# Patient Record
Sex: MALE
[De-identification: names, ages, dates, MRNs, and addresses within clinical notes are randomized per-mention and may not be internally consistent; named-entity substitution may affect disease eponyms.]

---

## 2017-06-06 ENCOUNTER — APPOINTMENT (OUTPATIENT)
Dept: GASTROENTEROLOGY | Facility: AMBULATORY MEDICAL SERVICES | Age: 57
End: 2017-06-06

## 2017-12-20 ENCOUNTER — RESULT REVIEW (OUTPATIENT)
Age: 57
End: 2017-12-20

## 2017-12-29 ENCOUNTER — TRANSCRIPTION ENCOUNTER (OUTPATIENT)
Age: 57
End: 2017-12-29

## 2018-08-19 ENCOUNTER — TRANSCRIPTION ENCOUNTER (OUTPATIENT)
Age: 58
End: 2018-08-19

## 2019-05-19 ENCOUNTER — TRANSCRIPTION ENCOUNTER (OUTPATIENT)
Age: 59
End: 2019-05-19

## 2019-08-29 ENCOUNTER — APPOINTMENT (OUTPATIENT)
Dept: INTERNAL MEDICINE | Facility: CLINIC | Age: 59
End: 2019-08-29
Payer: COMMERCIAL

## 2019-08-29 VITALS
HEIGHT: 68 IN | WEIGHT: 216 LBS | DIASTOLIC BLOOD PRESSURE: 100 MMHG | BODY MASS INDEX: 32.74 KG/M2 | HEART RATE: 94 BPM | TEMPERATURE: 99.1 F | SYSTOLIC BLOOD PRESSURE: 160 MMHG | OXYGEN SATURATION: 95 %

## 2019-08-29 VITALS — SYSTOLIC BLOOD PRESSURE: 142 MMHG | DIASTOLIC BLOOD PRESSURE: 82 MMHG

## 2019-08-29 DIAGNOSIS — M54.2 CERVICALGIA: ICD-10-CM

## 2019-08-29 DIAGNOSIS — Z83.49 FAMILY HISTORY OF OTHER ENDOCRINE, NUTRITIONAL AND METABOLIC DISEASES: ICD-10-CM

## 2019-08-29 DIAGNOSIS — Z82.49 FAMILY HISTORY OF ISCHEMIC HEART DISEASE AND OTHER DISEASES OF THE CIRCULATORY SYSTEM: ICD-10-CM

## 2019-08-29 DIAGNOSIS — R53.83 OTHER FATIGUE: ICD-10-CM

## 2019-08-29 DIAGNOSIS — Z80.0 FAMILY HISTORY OF MALIGNANT NEOPLASM OF DIGESTIVE ORGANS: ICD-10-CM

## 2019-08-29 DIAGNOSIS — Z83.71 FAMILY HISTORY OF COLONIC POLYPS: ICD-10-CM

## 2019-08-29 DIAGNOSIS — Z78.9 OTHER SPECIFIED HEALTH STATUS: ICD-10-CM

## 2019-08-29 DIAGNOSIS — Z00.00 ENCOUNTER FOR GENERAL ADULT MEDICAL EXAMINATION W/OUT ABNORMAL FINDINGS: ICD-10-CM

## 2019-08-29 PROCEDURE — 99205 OFFICE O/P NEW HI 60 MIN: CPT

## 2019-09-02 PROBLEM — R53.83 FATIGUE: Status: ACTIVE | Noted: 2019-08-29

## 2019-09-02 PROBLEM — M54.2 NECK PAIN: Status: ACTIVE | Noted: 2019-08-29

## 2019-09-02 PROBLEM — Z00.00 ENCOUNTER FOR PREVENTIVE HEALTH EXAMINATION: Status: ACTIVE | Noted: 2017-05-16

## 2019-09-02 NOTE — HISTORY OF PRESENT ILLNESS
[FreeTextEntry1] : The patient is a 59 year old male presents with neck pain and to re establish care. [de-identified] : The patient is a 59 year old male with no significant past medical history presents with complaint of right sided neck pain for the past week. 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 he denies recent falls or trauma. He has been taking over the counter Alleve and Tylenol with limited relief. He had similar symptoms several years ago and improved with anti inflammatories and muscle relaxant at that time. He denies upper extremity weakness or paresthesias. He admits to fatigue. He eats a poor diet and does not exercise regularly.

## 2019-09-02 NOTE — REVIEW OF SYSTEMS
[Negative] : Heme/Lymph [Fatigue] : fatigue [Muscle Pain] : muscle pain [FreeTextEntry9] : neck  pain

## 2019-09-02 NOTE — HEALTH RISK ASSESSMENT
[Good] : ~his/her~  mood as  good [Yes] : Yes [No falls in past year] : Patient reported no falls in the past year [0] : 2) Feeling down, depressed, or hopeless: Not at all (0) [Employed] : employed [] : No [de-identified] : occasional [Change in mental status noted] : No change in mental status noted [Language] : denies difficulty with language [Behavior] : denies difficulty with behavior [Handling Complex Tasks] : denies difficulty handling complex tasks [Reasoning] : denies difficulty with reasoning [ColonoscopyDate] : 06/17

## 2019-09-02 NOTE — PHYSICAL EXAM
[No Acute Distress] : no acute distress [Well Nourished] : well nourished [Well Developed] : well developed [Well-Appearing] : well-appearing [Normal Sclera/Conjunctiva] : normal sclera/conjunctiva [PERRL] : pupils equal round and reactive to light [Normal Outer Ear/Nose] : the outer ears and nose were normal in appearance [EOMI] : extraocular movements intact [Normal Oropharynx] : the oropharynx was normal [Normal TMs] : both tympanic membranes were normal [No Lymphadenopathy] : no lymphadenopathy [No JVD] : no jugular venous distention [Supple] : supple [No Respiratory Distress] : no respiratory distress  [Thyroid Normal, No Nodules] : the thyroid was normal and there were no nodules present [No Accessory Muscle Use] : no accessory muscle use [Clear to Auscultation] : lungs were clear to auscultation bilaterally [Normal Rate] : normal rate  [Regular Rhythm] : with a regular rhythm [No Murmur] : no murmur heard [Normal S1, S2] : normal S1 and S2 [No Edema] : there was no peripheral edema [Soft] : abdomen soft [Non Tender] : non-tender [No Masses] : no abdominal mass palpated [Non-distended] : non-distended [No HSM] : no HSM [Normal Posterior Cervical Nodes] : no posterior cervical lymphadenopathy [Normal Bowel Sounds] : normal bowel sounds [Normal Anterior Cervical Nodes] : no anterior cervical lymphadenopathy [No Joint Swelling] : no joint swelling [Grossly Normal Strength/Tone] : grossly normal strength/tone [Coordination Grossly Intact] : coordination grossly intact [No Rash] : no rash [No Focal Deficits] : no focal deficits [Normal Gait] : normal gait [Normal Affect] : the affect was normal [Alert and Oriented x3] : oriented to person, place, and time [Normal Insight/Judgement] : insight and judgment were intact [de-identified] : decreased range of motion with rotation to the right, paraspinal muscle tenderness right side of neck.  [de-identified] : obese

## 2019-09-02 NOTE — COUNSELING
[Encouraged to increase physical activity] : Encouraged to increase physical activity [Benefits of weight loss discussed] : Benefits of weight loss discussed

## 2019-09-04 ENCOUNTER — TRANSCRIPTION ENCOUNTER (OUTPATIENT)
Age: 59
End: 2019-09-04

## 2019-09-05 ENCOUNTER — APPOINTMENT (OUTPATIENT)
Dept: INTERNAL MEDICINE | Facility: CLINIC | Age: 59
End: 2019-09-05
Payer: COMMERCIAL

## 2019-09-05 VITALS — BODY MASS INDEX: 31.83 KG/M2 | WEIGHT: 210 LBS | HEIGHT: 68 IN

## 2019-09-05 VITALS
SYSTOLIC BLOOD PRESSURE: 154 MMHG | OXYGEN SATURATION: 96 % | DIASTOLIC BLOOD PRESSURE: 86 MMHG | HEART RATE: 79 BPM | TEMPERATURE: 98.7 F

## 2019-09-05 DIAGNOSIS — R79.89 OTHER SPECIFIED ABNORMAL FINDINGS OF BLOOD CHEMISTRY: ICD-10-CM

## 2019-09-05 PROCEDURE — 99214 OFFICE O/P EST MOD 30 MIN: CPT

## 2019-09-05 RX ORDER — METAXALONE 800 MG/1
800 TABLET ORAL 3 TIMES DAILY
Qty: 21 | Refills: 0 | Status: COMPLETED | COMMUNITY
Start: 2019-08-29 | End: 2019-09-05

## 2019-09-05 RX ORDER — MELOXICAM 15 MG/1
15 TABLET ORAL DAILY
Qty: 14 | Refills: 0 | Status: COMPLETED | COMMUNITY
Start: 2019-08-29 | End: 2019-09-05

## 2019-09-05 NOTE — PHYSICAL EXAM
[Normal Sclera/Conjunctiva] : normal sclera/conjunctiva [EOMI] : extraocular movements intact [No Edema] : there was no peripheral edema [Normal] : soft, non-tender, non-distended, no masses palpated, no HSM and normal bowel sounds [No Rash] : no rash [No CVA Tenderness] : no CVA  tenderness [No Focal Deficits] : no focal deficits [Coordination Grossly Intact] : coordination grossly intact [Normal Gait] : normal gait [Normal Affect] : the affect was normal [Normal Insight/Judgement] : insight and judgment were intact [Alert and Oriented x3] : oriented to person, place, and time [de-identified] : obese

## 2019-09-05 NOTE — HISTORY OF PRESENT ILLNESS
[de-identified] :  The patient is a 59-year-old male with no significant past medical history he presents with complaints of nausea for the past 2 weeks. He was recently seen for right-sided neck pain and reports that these symptoms resolved within 2 days of starting anti-inflammatory and muscle relaxant medication. He is no longer taking these medications. She was seen at urgent care 2 days ago with complaints of persistent nausea. Blood work hat had been requested by me was drawn on that day.  He admits that nausea has been present on and off for "a couple weeks". He denies associated vomiting, change in bowels, bright red blood per rectum,  melena or urinary frequency, urgency, dysuria.. He denies recent travel or sick contacts. He has a history of diverticulitis in the past but denies any symptoms of abdominal pain at present. He admits to occasional headaches and recent blood pressure readings have been elevated in this office at the urgent care. He has not been exercising regularly and his weight is relatively stable. patient admits that he has been under an extreme amount of stress as his son is going to be  this weekend and his mother has not been well. he has been eating a very bland diet over the last 24-48 hours and admits that this morning he is feeling a bit better. [FreeTextEntry1] : . The patient is a 59-year-old male who presents with complaints of nausea.

## 2019-09-05 NOTE — REVIEW OF SYSTEMS
[Fatigue] : fatigue [Nausea] : nausea [Headache] : headache [Negative] : Heme/Lymph [Fever] : no fever [Chills] : no chills [Constipation] : no constipation [Abdominal Pain] : no abdominal pain [Heartburn] : no heartburn [Vomiting] : no vomiting [Melena] : no melena [Skin Rash] : no skin rash

## 2019-09-05 NOTE — DATA REVIEWED
[FreeTextEntry1] : recent blood work from September 3, 2019 was reviewed with the patient. Abnormal results were as follows:\par \par Platelets = 423 elevated\par BUN = 25 elevated\par Creatinine = 2.32 elevated\par \par Total cholesterol = 211 elevated\par HDL cholesterol = 31 low\par LDL cholesterol = 151 elevated\par \par Vitamin D = 17.2 low

## 2019-09-12 ENCOUNTER — FORM ENCOUNTER (OUTPATIENT)
Age: 59
End: 2019-09-12

## 2019-09-12 ENCOUNTER — APPOINTMENT (OUTPATIENT)
Dept: INTERNAL MEDICINE | Facility: CLINIC | Age: 59
End: 2019-09-12
Payer: COMMERCIAL

## 2019-09-12 VITALS
HEART RATE: 76 BPM | DIASTOLIC BLOOD PRESSURE: 90 MMHG | WEIGHT: 210 LBS | BODY MASS INDEX: 33.75 KG/M2 | HEIGHT: 66 IN | SYSTOLIC BLOOD PRESSURE: 150 MMHG

## 2019-09-12 DIAGNOSIS — N28.9 DISORDER OF KIDNEY AND URETER, UNSPECIFIED: ICD-10-CM

## 2019-09-12 DIAGNOSIS — K21.9 GASTRO-ESOPHAGEAL REFLUX DISEASE W/OUT ESOPHAGITIS: ICD-10-CM

## 2019-09-12 PROCEDURE — 99213 OFFICE O/P EST LOW 20 MIN: CPT

## 2019-09-12 RX ORDER — OMEPRAZOLE MAGNESIUM 20 MG/1
20 TABLET, DELAYED RELEASE ORAL DAILY
Qty: 14 | Refills: 0 | Status: ACTIVE | COMMUNITY
Start: 2019-09-12

## 2019-09-12 NOTE — HISTORY OF PRESENT ILLNESS
[FreeTextEntry8] : RISHABH PARSONS is a 59 year old male who presents with complaints of elevated blood pressure and nausea. He was recently evaluated by PCP (Dr. Ferrer) last week for similar complaints. He verbalizes continued stress related to planning/preparations for his son's wedding this Saturday and he inquires on whether this could be the cause for his uncontrolled BP. At home, he has been checking intermittently, readings are consistently high (150's/high 80s-90s). He also insists that pror to May, he had normal BP readings (has examinations done for his job). He denies any headaches, visual disturbances, chest pains or shortness of breath, but continues to feel nauseous. He has been burping, as well, and finds the nausea is worse while lying down at night. He also feels some nasal congestion at night, as well. He has not been taking any OTC antacids. His last drink was 1 month ago (stopped due to the nausea), usually drinks 1 drink at night. Admits to poor diet, eats mainly [employer] and other fast food due to the nature of his work, finds it hard to find time to cook...then later admits he is likely makign excuses and could probably cook more and choose healthier foods as well as exercise. He had some neck pain last month which he feels was likely stress-related, improved with muscle relaxer and Meloxicam and had been taking "a lot of Aleve" prior to that, as well. He stopped all pain meds/muscle relaxers once pain improved (took for ~4 days total).

## 2019-09-12 NOTE — PHYSICAL EXAM
[No Acute Distress] : no acute distress [Well Developed] : well developed [Well Nourished] : well nourished [Well-Appearing] : well-appearing [Normal Sclera/Conjunctiva] : normal sclera/conjunctiva [EOMI] : extraocular movements intact [PERRL] : pupils equal round and reactive to light [Normal Oropharynx] : the oropharynx was normal [Normal Outer Ear/Nose] : the outer ears and nose were normal in appearance [No JVD] : no jugular venous distention [No Lymphadenopathy] : no lymphadenopathy [Supple] : supple [Thyroid Normal, No Nodules] : the thyroid was normal and there were no nodules present [No Respiratory Distress] : no respiratory distress  [No Accessory Muscle Use] : no accessory muscle use [Clear to Auscultation] : lungs were clear to auscultation bilaterally [Normal Rate] : normal rate  [Regular Rhythm] : with a regular rhythm [Normal S1, S2] : normal S1 and S2 [No Murmur] : no murmur heard [No Carotid Bruits] : no carotid bruits [No Abdominal Bruit] : a ~M bruit was not heard ~T in the abdomen [No Varicosities] : no varicosities [Pedal Pulses Present] : the pedal pulses are present [No Edema] : there was no peripheral edema [No Palpable Aorta] : no palpable aorta [No Extremity Clubbing/Cyanosis] : no extremity clubbing/cyanosis [Non Tender] : non-tender [Soft] : abdomen soft [No Masses] : no abdominal mass palpated [Non-distended] : non-distended [No HSM] : no HSM [Normal Bowel Sounds] : normal bowel sounds [No CVA Tenderness] : no CVA  tenderness [Coordination Grossly Intact] : coordination grossly intact [Normal Gait] : normal gait [Deep Tendon Reflexes (DTR)] : deep tendon reflexes were 2+ and symmetric [Normal Insight/Judgement] : insight and judgment were intact [Normal Affect] : the affect was normal [de-identified] : appears slightly anxious

## 2019-09-12 NOTE — PLAN
[FreeTextEntry1] : #GERD: discussed dietary changes including lowering acidic/spicy foods, caffeine, alcohol, eating smaller meals and not eating late. He will try OTC Prilosec x 14 days, re-evaluate in 2 weeks.\par #HTN: increase Amlodipine to 10 mg qD as above, low salt diet reviewed and recommended, re-evaluate in 2 weeks.\par #Renal insufficiency: etiology uncertain--could be 2/2 HTN, but needs further workup. Note results of SPEP and DAVIDE in [facility] stating normal results. Would recommended UPEP, UA, repeat chem and PTH; advised pt to go for ordered Abdominal sono. Advised to avoid nephrotoxic meds such as NSAIDs. Plan on referral to nephrology in 2 weeks if persistent.

## 2019-09-12 NOTE — REVIEW OF SYSTEMS
[Abdominal Pain] : no abdominal pain [Nausea] : nausea [Diarrhea] : no diarrhea [Constipation] : no constipation [Vomiting] : no vomiting [Melena] : no melena [Heartburn] : no heartburn [Negative] : Heme/Lymph

## 2019-09-13 ENCOUNTER — APPOINTMENT (OUTPATIENT)
Dept: ULTRASOUND IMAGING | Facility: CLINIC | Age: 59
End: 2019-09-13
Payer: COMMERCIAL

## 2019-09-13 ENCOUNTER — OUTPATIENT (OUTPATIENT)
Dept: OUTPATIENT SERVICES | Facility: HOSPITAL | Age: 59
LOS: 1 days | End: 2019-09-13
Payer: COMMERCIAL

## 2019-09-13 DIAGNOSIS — N28.9 DISORDER OF KIDNEY AND URETER, UNSPECIFIED: ICD-10-CM

## 2019-09-13 PROCEDURE — 76700 US EXAM ABDOM COMPLETE: CPT | Mod: 26

## 2019-09-13 PROCEDURE — 76700 US EXAM ABDOM COMPLETE: CPT

## 2019-09-26 ENCOUNTER — APPOINTMENT (OUTPATIENT)
Dept: INTERNAL MEDICINE | Facility: CLINIC | Age: 59
End: 2019-09-26
Payer: COMMERCIAL

## 2019-09-26 VITALS
BODY MASS INDEX: 32.13 KG/M2 | OXYGEN SATURATION: 97 % | HEART RATE: 87 BPM | TEMPERATURE: 98.5 F | WEIGHT: 212 LBS | HEIGHT: 68 IN | SYSTOLIC BLOOD PRESSURE: 140 MMHG | DIASTOLIC BLOOD PRESSURE: 90 MMHG

## 2019-09-26 DIAGNOSIS — R11.0 NAUSEA: ICD-10-CM

## 2019-09-26 DIAGNOSIS — K76.0 FATTY (CHANGE OF) LIVER, NOT ELSEWHERE CLASSIFIED: ICD-10-CM

## 2019-09-26 PROCEDURE — 99213 OFFICE O/P EST LOW 20 MIN: CPT

## 2019-09-26 NOTE — REVIEW OF SYSTEMS
[Fatigue] : fatigue [Nausea] : nausea [Negative] : Neurological [Fever] : no fever [Night Sweats] : no night sweats [Chills] : no chills [Constipation] : no constipation [Vomiting] : no vomiting [Diarrhea] : no diarrhea [Heartburn] : no heartburn [Skin Rash] : no skin rash [Melena] : no melena

## 2019-09-26 NOTE — PHYSICAL EXAM
[Normal] : normal rate, regular rhythm, normal S1 and S2 and no murmur heard [Soft] : abdomen soft [Non-distended] : non-distended [Non Tender] : non-tender [No Masses] : no abdominal mass palpated [No HSM] : no HSM [Normal Bowel Sounds] : normal bowel sounds

## 2019-09-26 NOTE — HISTORY OF PRESENT ILLNESS
[FreeTextEntry1] : to re-evaluate hypertension, nausea [de-identified] : RISHABH PARSONS is a 59 year old male who presents for short-term medical re-evaluation of hypertension and symptoms of nausea. He is presently in course of OTC Prilosec and has cut down on fatty/friend foods which he feels has improved much of his belching and abdominal discomfort which was present at night, but he does continue to feel intermittently nauseous (without vomiting), generalized fatigue and slight discomfort in epigastric area. He is unable to determine if symptoms are worse after eating. He denies any fevers/nightsweats. His blood pressure readings at home are ranging in mid 130s/80s.

## 2019-09-26 NOTE — PLAN
[FreeTextEntry1] : #Hypertension: BP improved on home readings as well as on office visit today (, prior was 150). Will continue present Amlodipine and re-evaluate in office after his GB is evaluated--advised patient to schedule appt either after GB surgery or if he does not have any surgery, in 3 months.\par #Fatty liver: counseled patient on maintaining low fat diet and exercising as fatty liver could also be contributing to his symptoms of nausea and abdominal discomfort. \par #CKD: patient will return tomorrow AM for labs; plan on renal referral if no improvement. Abd sono note renal cysts and possible nonobstructive stone.

## 2019-09-27 ENCOUNTER — APPOINTMENT (OUTPATIENT)
Dept: INTERNAL MEDICINE | Facility: CLINIC | Age: 59
End: 2019-09-27
Payer: COMMERCIAL

## 2019-09-27 PROBLEM — R11.0 NAUSEA: Status: ACTIVE | Noted: 2019-09-05

## 2019-09-27 PROBLEM — K76.0 FATTY LIVER: Status: ACTIVE | Noted: 2019-09-26

## 2019-09-27 PROCEDURE — 36415 COLL VENOUS BLD VENIPUNCTURE: CPT

## 2019-09-30 ENCOUNTER — RESULT REVIEW (OUTPATIENT)
Age: 59
End: 2019-09-30

## 2019-09-30 LAB
ALBUMIN SERPL ELPH-MCNC: 4.8 G/DL
ALBUPE: 15.4 %
ALP BLD-CCNC: 77 U/L
ALPHA1UPE: 40.2 %
ALPHA2UPE: 18.7 %
ALT SERPL-CCNC: 22 U/L
ANION GAP SERPL CALC-SCNC: 14 MMOL/L
AST SERPL-CCNC: 21 U/L
BETAUPE: 16.6 %
BILIRUB SERPL-MCNC: 0.6 MG/DL
BUN SERPL-MCNC: 28 MG/DL
CALCIUM SERPL-MCNC: 9.6 MG/DL
CALCIUM SERPL-MCNC: 9.6 MG/DL
CHLORIDE SERPL-SCNC: 102 MMOL/L
CO2 SERPL-SCNC: 26 MMOL/L
CREAT SERPL-MCNC: 1.69 MG/DL
GAMMAUPE: 9.1 %
GLUCOSE SERPL-MCNC: 108 MG/DL
IGA 24H UR QL IFE: NORMAL
KAPPA LC 24H UR QL: NORMAL
PARATHYROID HORMONE INTACT: 61 PG/ML
POTASSIUM SERPL-SCNC: 3.4 MMOL/L
PROT PATTERN 24H UR ELPH-IMP: NORMAL
PROT SERPL-MCNC: 7.2 G/DL
PROT UR-MCNC: 16 MG/DL
PROT UR-MCNC: 16 MG/DL
SODIUM SERPL-SCNC: 142 MMOL/L

## 2019-10-24 ENCOUNTER — APPOINTMENT (OUTPATIENT)
Dept: INTERNAL MEDICINE | Facility: CLINIC | Age: 59
End: 2019-10-24
Payer: COMMERCIAL

## 2019-10-24 ENCOUNTER — APPOINTMENT (OUTPATIENT)
Dept: CARDIOLOGY | Facility: CLINIC | Age: 59
End: 2019-10-24
Payer: COMMERCIAL

## 2019-10-24 ENCOUNTER — NON-APPOINTMENT (OUTPATIENT)
Age: 59
End: 2019-10-24

## 2019-10-24 VITALS
BODY MASS INDEX: 31.52 KG/M2 | SYSTOLIC BLOOD PRESSURE: 120 MMHG | HEIGHT: 68 IN | OXYGEN SATURATION: 98 % | TEMPERATURE: 98.4 F | WEIGHT: 208 LBS | HEART RATE: 89 BPM | DIASTOLIC BLOOD PRESSURE: 80 MMHG

## 2019-10-24 VITALS
HEART RATE: 90 BPM | SYSTOLIC BLOOD PRESSURE: 118 MMHG | BODY MASS INDEX: 31.83 KG/M2 | WEIGHT: 210 LBS | HEIGHT: 68 IN | DIASTOLIC BLOOD PRESSURE: 80 MMHG | OXYGEN SATURATION: 98 %

## 2019-10-24 DIAGNOSIS — I10 ESSENTIAL (PRIMARY) HYPERTENSION: ICD-10-CM

## 2019-10-24 DIAGNOSIS — Z86.39 PERSONAL HISTORY OF OTHER ENDOCRINE, NUTRITIONAL AND METABOLIC DISEASE: ICD-10-CM

## 2019-10-24 DIAGNOSIS — E78.5 HYPERLIPIDEMIA, UNSPECIFIED: ICD-10-CM

## 2019-10-24 DIAGNOSIS — R94.31 ABNORMAL ELECTROCARDIOGRAM [ECG] [EKG]: ICD-10-CM

## 2019-10-24 DIAGNOSIS — K80.20 CALCULUS OF GALLBLADDER W/OUT CHOLECYSTITIS W/OUT OBSTRUCTION: ICD-10-CM

## 2019-10-24 DIAGNOSIS — Z01.818 ENCOUNTER FOR OTHER PREPROCEDURAL EXAMINATION: ICD-10-CM

## 2019-10-24 PROCEDURE — 93000 ELECTROCARDIOGRAM COMPLETE: CPT

## 2019-10-24 PROCEDURE — 99205 OFFICE O/P NEW HI 60 MIN: CPT | Mod: 25

## 2019-10-24 PROCEDURE — 93306 TTE W/DOPPLER COMPLETE: CPT

## 2019-10-24 PROCEDURE — 99215 OFFICE O/P EST HI 40 MIN: CPT | Mod: 25

## 2019-10-25 ENCOUNTER — APPOINTMENT (OUTPATIENT)
Dept: CARDIOLOGY | Facility: CLINIC | Age: 59
End: 2019-10-25
Payer: COMMERCIAL

## 2019-10-25 PROCEDURE — 93015 CV STRESS TEST SUPVJ I&R: CPT

## 2019-10-25 NOTE — PHYSICAL EXAM
[Normal Appearance] : normal appearance [General Appearance - Well Developed] : well developed [Well Groomed] : well groomed [General Appearance - Well Nourished] : well nourished [No Deformities] : no deformities [General Appearance - In No Acute Distress] : no acute distress [Normal Conjunctiva] : the conjunctiva exhibited no abnormalities [Eyelids - No Xanthelasma] : the eyelids demonstrated no xanthelasmas [Normal Oral Mucosa] : normal oral mucosa [No Oral Pallor] : no oral pallor [No Oral Cyanosis] : no oral cyanosis [Normal Jugular Venous A Waves Present] : normal jugular venous A waves present [Normal Jugular Venous V Waves Present] : normal jugular venous V waves present [No Jugular Venous Payan A Waves] : no jugular venous payan A waves [Respiration, Rhythm And Depth] : normal respiratory rhythm and effort [Exaggerated Use Of Accessory Muscles For Inspiration] : no accessory muscle use [Auscultation Breath Sounds / Voice Sounds] : lungs were clear to auscultation bilaterally [Heart Rate And Rhythm] : heart rate and rhythm were normal [Murmurs] : no murmurs present [Heart Sounds] : normal S1 and S2 [Edema] : no peripheral edema present [1+] : left 1+ [Bowel Sounds] : normal bowel sounds [Abdomen Soft] : soft [Abdomen Tenderness] : non-tender [Abnormal Walk] : normal gait [Gait - Sufficient For Exercise Testing] : the gait was sufficient for exercise testing [Nail Clubbing] : no clubbing of the fingernails [Cyanosis, Localized] : no localized cyanosis [Petechial Hemorrhages (___cm)] : no petechial hemorrhages [Skin Color & Pigmentation] : normal skin color and pigmentation [] : no rash [No Venous Stasis] : no venous stasis [Skin Lesions] : no skin lesions [No Skin Ulcers] : no skin ulcer [No Xanthoma] : no  xanthoma was observed [Oriented To Time, Place, And Person] : oriented to person, place, and time [Affect] : the affect was normal [Mood] : the mood was normal [No Anxiety] : not feeling anxious [Right Carotid Bruit] : no bruit heard over the right carotid [Left Carotid Bruit] : no bruit heard over the left carotid [Bruit] : no bruit heard

## 2019-10-25 NOTE — HISTORY OF PRESENT ILLNESS
[No Pertinent Cardiac History] : no history of aortic stenosis, atrial fibrillation, coronary artery disease, recent myocardial infarction, or implantable device/pacemaker [No Adverse Anesthesia Reaction] : no adverse anesthesia reaction in self or family member [No Pertinent Pulmonary History] : no history of asthma, COPD, sleep apnea, or smoking [Chronic Kidney Disease] : chronic kidney disease [Good (7-10 METs)] : Good (7-10 METs) [Chronic Anticoagulation] : no chronic anticoagulation [Diabetes] : no diabetes [FreeTextEntry1] : Cholecystectomy [FreeTextEntry2] : 10/28/2019 [FreeTextEntry3] : Dr. Esquivel [FreeTextEntry4] : RISHABH PARSONS is a 59 year old male who presents for medical optimization for proposed procedure. He is scheduled for lap cholecystectomy for persistent symptoms of nausea and vague abdominal discomfort. He has had ongoing symptoms since ~ August and is looking forward to the procedure. He denies any chest pains, shortness of breath, is very physically active with his job, and also rides his bikes, but does note over the past few years that his exercise tolerance is slightly lower that before. When he carries many tools, he gets "winded" when climbing stairs, but he feels that anyone in a similar situation would get winded. He also notes some stress related to his mother having passed away last week, but denies any symptoms of depression. [FreeTextEntry6] : +Dyspnea on exertion [FreeTextEntry7] : EKG: Sinus rhythm @ 83 bpm, no ST-T wave abnormalities

## 2019-10-25 NOTE — REVIEW OF SYSTEMS
[Dyspnea on Exertion] : dyspnea on exertion [Nausea] : nausea [Abdominal Pain] : abdominal pain [Heartburn] : heartburn [Negative] : Psychiatric [Chest Pain] : no chest pain [Palpitations] : no palpitations [Claudication] : no  leg claudication [Orthopena] : no orthopnea [Lower Ext Edema] : no lower extremity edema [Paroxysmal Nocturnal Dyspnea] : no paroxysmal nocturnal dyspnea [Shortness Of Breath] : no shortness of breath [Wheezing] : no wheezing [Cough] : no cough [Constipation] : no constipation [Diarrhea] : no diarrhea [Vomiting] : no vomiting [Melena] : no melena

## 2019-10-25 NOTE — CONSULT LETTER
[Consult Letter:] : I had the pleasure of evaluating your patient, [unfilled]. [( Thank you for referring [unfilled] for consultation for _____ )] : Thank you for referring [unfilled] for consultation for [unfilled] [Please see my note below.] : Please see my note below. [Dear  ___] : Dear  [unfilled], [Sincerely,] : Sincerely, [Consult Closing:] : Thank you very much for allowing me to participate in the care of this patient.  If you have any questions, please do not hesitate to contact me. [FreeTextEntry2] : Dr. Eron Esquivel [FreeTextEntry3] : Linda Young D.O.

## 2019-10-25 NOTE — HISTORY OF PRESENT ILLNESS
[Preoperative Visit] : for a medical evaluation prior to surgery [Scheduled Procedure ___] : a [unfilled] [Date of Surgery ___] : on [unfilled] [Surgeon Name ___] : surgeon: [unfilled] [Good] : Good [Stable] : Stable [de-identified] : Fax number: [phone] [FreeTextEntry1] : Gerald is a pleasant 59-year-old gentleman with history of inguinal hernia repair, hypertension, renal insufficiency, dyslipidemia, who is experiencing increasing nausea and ultimately found to have Cholelithiasis for which he is being planned for upcoming cholecystectomy this coming Monday. He is seen to have a need for cardiac clearance and is here for cardiac assessment. No current chest pains or shortness of breath. He does ride his bicycle without lifestyle limiting symptoms and reports eating healthier.

## 2019-10-25 NOTE — RESULTS/DATA
[] : results reviewed [de-identified] : note H/H 12.4/36.8 [de-identified] : Note BUN/Cr 23/1.7 (stable as compared to 9/30/2019 28/1.69) [de-identified] : Sinus rhythm @ 83 bpm, no ST-T wave abnormalities [de-identified] : Lipid profile 09/3/2019 \par Total cholesterol = 211 elevated\par HDL cholesterol = 31 low\par LDL cholesterol = 151 elevated

## 2019-10-25 NOTE — ADDENDUM
[FreeTextEntry1] : Gerald underwent an echocardiogram in our office revealing normal biventricular size, wall thickness and systolic function without significant valvular heart disease. He also underwent exercise stress test in our office with an exercise duration of 9 minutes on the Luis Enrique protocol achieving age-predicted maximal heart rate, normal heart rate and blood pressure response to exertion, and the stress ECG demonstrated no evidence of any clinically significant ST segment changes. \par \par Based on his current clinical and cardiovascular status, he may proceed ahead with his upcoming cholecystectomy with routine hemodynamic monitoring perioperatively. He will followup with you for general care and can see me as needed.

## 2019-10-25 NOTE — DISCUSSION/SUMMARY
[FreeTextEntry1] : I've ordered an echocardiogram to assess her LV function and valvular status we'll with regular stress test to gauge cardiac fitness, ECG and vital response to exertion.I recommended a heart healthy lifestyle including a low-saturated fat, low cholesterol diet with improved aerobic physical fitness over time for cardiovascular benefits. Carbohydrate restriction and salt limitation encouraged. Follow up with you for care. An addendum to this report regarding fitness for upcoming cholecystectomy will be a pending results of his cardiac studies.

## 2020-01-13 ENCOUNTER — RX RENEWAL (OUTPATIENT)
Age: 60
End: 2020-01-13

## 2020-04-22 ENCOUNTER — RX RENEWAL (OUTPATIENT)
Age: 60
End: 2020-04-22

## 2020-06-30 ENCOUNTER — RX RENEWAL (OUTPATIENT)
Age: 60
End: 2020-06-30

## 2020-06-30 RX ORDER — AMLODIPINE BESYLATE 10 MG/1
10 TABLET ORAL DAILY
Qty: 90 | Refills: 0 | Status: ACTIVE | COMMUNITY
Start: 2019-09-05 | End: 1900-01-01